# Patient Record
Sex: MALE | Race: BLACK OR AFRICAN AMERICAN | NOT HISPANIC OR LATINO | Employment: FULL TIME | URBAN - METROPOLITAN AREA
[De-identification: names, ages, dates, MRNs, and addresses within clinical notes are randomized per-mention and may not be internally consistent; named-entity substitution may affect disease eponyms.]

---

## 2017-07-19 ENCOUNTER — HOSPITAL ENCOUNTER (INPATIENT)
Facility: HOSPITAL | Age: 42
LOS: 1 days | Discharge: HOME | DRG: 683 | End: 2017-07-20
Attending: EMERGENCY MEDICINE | Admitting: HOSPITALIST
Payer: MEDICAID

## 2017-07-19 DIAGNOSIS — M62.82 ACUTE RENAL FAILURE DUE TO RHABDOMYOLYSIS: ICD-10-CM

## 2017-07-19 DIAGNOSIS — R01.1 HEART MURMUR: ICD-10-CM

## 2017-07-19 DIAGNOSIS — R56.9 SEIZURES: ICD-10-CM

## 2017-07-19 DIAGNOSIS — N17.9 ACUTE RENAL FAILURE, UNSPECIFIED ACUTE RENAL FAILURE TYPE: Primary | ICD-10-CM

## 2017-07-19 DIAGNOSIS — N17.9 ACUTE RENAL FAILURE DUE TO RHABDOMYOLYSIS: ICD-10-CM

## 2017-07-19 DIAGNOSIS — D72.823 LEUKEMOID REACTION: ICD-10-CM

## 2017-07-19 DIAGNOSIS — M62.82 NON-TRAUMATIC RHABDOMYOLYSIS: ICD-10-CM

## 2017-07-19 DIAGNOSIS — Z72.0 TOBACCO ABUSE: ICD-10-CM

## 2017-07-19 PROBLEM — D72.829 LEUKOCYTOSIS: Status: ACTIVE | Noted: 2017-07-19

## 2017-07-19 LAB
ALBUMIN SERPL BCP-MCNC: 4.4 G/DL
ALBUMIN SERPL BCP-MCNC: 5.7 G/DL
ALP SERPL-CCNC: 103 U/L
ALP SERPL-CCNC: 82 U/L
ALT SERPL W/O P-5'-P-CCNC: 23 U/L
ALT SERPL W/O P-5'-P-CCNC: 29 U/L
AMPHET+METHAMPHET UR QL: NEGATIVE
ANION GAP SERPL CALC-SCNC: 18 MMOL/L
ANION GAP SERPL CALC-SCNC: 24 MMOL/L
ANISOCYTOSIS BLD QL SMEAR: SLIGHT
AST SERPL-CCNC: 34 U/L
AST SERPL-CCNC: 38 U/L
BACTERIA #/AREA URNS HPF: ABNORMAL /HPF
BARBITURATES UR QL SCN>200 NG/ML: NEGATIVE
BASOPHILS # BLD AUTO: 0.01 K/UL
BASOPHILS NFR BLD: 0 %
BENZODIAZ UR QL SCN>200 NG/ML: NEGATIVE
BILIRUB SERPL-MCNC: 0.9 MG/DL
BILIRUB SERPL-MCNC: 0.9 MG/DL
BILIRUB UR QL STRIP: NEGATIVE
BUN SERPL-MCNC: 51 MG/DL
BUN SERPL-MCNC: 57 MG/DL
BZE UR QL SCN: NEGATIVE
CALCIUM SERPL-MCNC: 11.4 MG/DL
CALCIUM SERPL-MCNC: 9.4 MG/DL
CANNABINOIDS UR QL SCN: NEGATIVE
CHLORIDE SERPL-SCNC: 100 MMOL/L
CHLORIDE SERPL-SCNC: 91 MMOL/L
CK SERPL-CCNC: 2291 U/L
CK SERPL-CCNC: 2933 U/L
CLARITY UR: ABNORMAL
CO2 SERPL-SCNC: 21 MMOL/L
CO2 SERPL-SCNC: 22 MMOL/L
COLOR UR: YELLOW
CREAT SERPL-MCNC: 5 MG/DL
CREAT SERPL-MCNC: 6.2 MG/DL
CREAT UR-MCNC: 202.4 MG/DL
DACRYOCYTES BLD QL SMEAR: ABNORMAL
DIFFERENTIAL METHOD: ABNORMAL
EOSINOPHIL # BLD AUTO: 0 K/UL
EOSINOPHIL NFR BLD: 0 %
ERYTHROCYTE [DISTWIDTH] IN BLOOD BY AUTOMATED COUNT: 13.5 %
EST. GFR  (AFRICAN AMERICAN): 12 ML/MIN/1.73 M^2
EST. GFR  (AFRICAN AMERICAN): 15 ML/MIN/1.73 M^2
EST. GFR  (NON AFRICAN AMERICAN): 10 ML/MIN/1.73 M^2
EST. GFR  (NON AFRICAN AMERICAN): 13 ML/MIN/1.73 M^2
ETHANOL SERPL-MCNC: <10 MG/DL
GLUCOSE SERPL-MCNC: 105 MG/DL
GLUCOSE SERPL-MCNC: 133 MG/DL
GLUCOSE UR QL STRIP: NEGATIVE
GRAN CASTS #/AREA URNS LPF: 6 /LPF
HCT VFR BLD AUTO: 54.3 %
HGB BLD-MCNC: 19.5 G/DL
HGB UR QL STRIP: ABNORMAL
HYALINE CASTS #/AREA URNS LPF: 3 /LPF
KETONES UR QL STRIP: ABNORMAL
LEUKOCYTE ESTERASE UR QL STRIP: ABNORMAL
LIPASE SERPL-CCNC: 20 U/L
LYMPHOCYTES # BLD AUTO: 1.1 K/UL
LYMPHOCYTES NFR BLD: 4.3 %
MCH RBC QN AUTO: 31.2 PG
MCHC RBC AUTO-ENTMCNC: 35.9 %
MCV RBC AUTO: 87 FL
METHADONE UR QL SCN>300 NG/ML: NEGATIVE
MICROSCOPIC COMMENT: ABNORMAL
MONOCYTES # BLD AUTO: 1.5 K/UL
MONOCYTES NFR BLD: 5.9 %
NEUTROPHILS # BLD AUTO: 23 K/UL
NEUTROPHILS NFR BLD: 90.2 %
NITRITE UR QL STRIP: NEGATIVE
OPIATES UR QL SCN: NORMAL
OVALOCYTES BLD QL SMEAR: ABNORMAL
PCP UR QL SCN>25 NG/ML: NEGATIVE
PH UR STRIP: 5 [PH] (ref 5–8)
PLATELET # BLD AUTO: 260 K/UL
PMV BLD AUTO: 11.3 FL
POTASSIUM SERPL-SCNC: 4.5 MMOL/L
POTASSIUM SERPL-SCNC: 4.5 MMOL/L
PROT SERPL-MCNC: 10.7 G/DL
PROT SERPL-MCNC: 8.3 G/DL
PROT UR QL STRIP: ABNORMAL
RBC # BLD AUTO: 6.26 M/UL
RBC #/AREA URNS HPF: 50 /HPF (ref 0–4)
SODIUM SERPL-SCNC: 137 MMOL/L
SODIUM SERPL-SCNC: 139 MMOL/L
SP GR UR STRIP: >1.03 (ref 1–1.03)
SQUAMOUS #/AREA URNS HPF: 4 /HPF
TOXICOLOGY INFORMATION: NORMAL
URN SPEC COLLECT METH UR: ABNORMAL
UROBILINOGEN UR STRIP-ACNC: NEGATIVE EU/DL
WBC # BLD AUTO: 25.62 K/UL
WBC #/AREA URNS HPF: 12 /HPF (ref 0–5)

## 2017-07-19 PROCEDURE — 85025 COMPLETE CBC W/AUTO DIFF WBC: CPT

## 2017-07-19 PROCEDURE — 80320 DRUG SCREEN QUANTALCOHOLS: CPT

## 2017-07-19 PROCEDURE — 25000003 PHARM REV CODE 250: Performed by: EMERGENCY MEDICINE

## 2017-07-19 PROCEDURE — 82550 ASSAY OF CK (CPK): CPT

## 2017-07-19 PROCEDURE — 12000002 HC ACUTE/MED SURGE SEMI-PRIVATE ROOM

## 2017-07-19 PROCEDURE — 96360 HYDRATION IV INFUSION INIT: CPT

## 2017-07-19 PROCEDURE — 82550 ASSAY OF CK (CPK): CPT | Mod: 91

## 2017-07-19 PROCEDURE — 36415 COLL VENOUS BLD VENIPUNCTURE: CPT

## 2017-07-19 PROCEDURE — 80307 DRUG TEST PRSMV CHEM ANLYZR: CPT

## 2017-07-19 PROCEDURE — 99284 EMERGENCY DEPT VISIT MOD MDM: CPT | Mod: 25

## 2017-07-19 PROCEDURE — 80053 COMPREHEN METABOLIC PANEL: CPT | Mod: 91

## 2017-07-19 PROCEDURE — 81000 URINALYSIS NONAUTO W/SCOPE: CPT

## 2017-07-19 PROCEDURE — 80053 COMPREHEN METABOLIC PANEL: CPT

## 2017-07-19 PROCEDURE — 83690 ASSAY OF LIPASE: CPT

## 2017-07-19 PROCEDURE — 63600175 PHARM REV CODE 636 W HCPCS: Performed by: EMERGENCY MEDICINE

## 2017-07-19 RX ORDER — MORPHINE SULFATE 10 MG/ML
2 INJECTION INTRAMUSCULAR; INTRAVENOUS; SUBCUTANEOUS EVERY 4 HOURS PRN
Status: DISCONTINUED | OUTPATIENT
Start: 2017-07-19 | End: 2017-07-20 | Stop reason: HOSPADM

## 2017-07-19 RX ORDER — ACETAMINOPHEN 325 MG/1
650 TABLET ORAL EVERY 8 HOURS PRN
Status: DISCONTINUED | OUTPATIENT
Start: 2017-07-19 | End: 2017-07-20 | Stop reason: HOSPADM

## 2017-07-19 RX ORDER — ONDANSETRON 2 MG/ML
4 INJECTION INTRAMUSCULAR; INTRAVENOUS EVERY 4 HOURS PRN
Status: DISCONTINUED | OUTPATIENT
Start: 2017-07-19 | End: 2017-07-20 | Stop reason: HOSPADM

## 2017-07-19 RX ORDER — POLYETHYLENE GLYCOL 3350 17 G/17G
17 POWDER, FOR SOLUTION ORAL DAILY
Status: DISCONTINUED | OUTPATIENT
Start: 2017-07-19 | End: 2017-07-20 | Stop reason: HOSPADM

## 2017-07-19 RX ORDER — HEPARIN SODIUM 5000 [USP'U]/ML
5000 INJECTION, SOLUTION INTRAVENOUS; SUBCUTANEOUS EVERY 8 HOURS
Status: DISCONTINUED | OUTPATIENT
Start: 2017-07-19 | End: 2017-07-20 | Stop reason: HOSPADM

## 2017-07-19 RX ORDER — SODIUM CHLORIDE 9 MG/ML
INJECTION, SOLUTION INTRAVENOUS CONTINUOUS
Status: DISCONTINUED | OUTPATIENT
Start: 2017-07-19 | End: 2017-07-20 | Stop reason: HOSPADM

## 2017-07-19 RX ADMIN — HEPARIN SODIUM 5000 UNITS: 5000 INJECTION, SOLUTION INTRAVENOUS; SUBCUTANEOUS at 09:07

## 2017-07-19 RX ADMIN — SODIUM CHLORIDE: 0.9 INJECTION, SOLUTION INTRAVENOUS at 01:07

## 2017-07-19 RX ADMIN — ONDANSETRON 4 MG: 2 INJECTION INTRAMUSCULAR; INTRAVENOUS at 06:07

## 2017-07-19 RX ADMIN — SODIUM CHLORIDE: 0.9 INJECTION, SOLUTION INTRAVENOUS at 05:07

## 2017-07-19 RX ADMIN — MORPHINE SULFATE 2 MG: 10 INJECTION INTRAVENOUS at 10:07

## 2017-07-19 RX ADMIN — SODIUM CHLORIDE: 0.9 INJECTION, SOLUTION INTRAVENOUS at 08:07

## 2017-07-19 RX ADMIN — SODIUM CHLORIDE: 0.9 INJECTION, SOLUTION INTRAVENOUS at 09:07

## 2017-07-19 RX ADMIN — ONDANSETRON 4 MG: 2 INJECTION INTRAMUSCULAR; INTRAVENOUS at 08:07

## 2017-07-19 RX ADMIN — ONDANSETRON 4 MG: 2 INJECTION INTRAMUSCULAR; INTRAVENOUS at 10:07

## 2017-07-19 RX ADMIN — MORPHINE SULFATE 2 MG: 10 INJECTION INTRAVENOUS at 08:07

## 2017-07-19 RX ADMIN — SODIUM CHLORIDE 1000 ML: 0.9 INJECTION, SOLUTION INTRAVENOUS at 05:07

## 2017-07-19 RX ADMIN — MORPHINE SULFATE 2 MG: 10 INJECTION INTRAVENOUS at 06:07

## 2017-07-19 RX ADMIN — HEPARIN SODIUM 5000 UNITS: 5000 INJECTION, SOLUTION INTRAVENOUS; SUBCUTANEOUS at 04:07

## 2017-07-19 NOTE — PLAN OF CARE
"SW met with patient to complete discharge needs assessment. SW provided and reviewed with patient "[facility] Packet", "Discharge Planning Begins on Admission" brochure and "Help At Home" handout. SW discussed with patient the things he will be responsible for to manage his healthcare at home. Patient reports that he has no preference for doctor appointment times. YEISON requested for patient to call his wife and ask her to look at his medicaid card to find out name of PCP.      [id number] - [place], [place] - [address] AT [place] [address]  [facility] [address]  Phone: [phone] Fax: [phone]      [id number] - [place], [place] - [address] AT [facility] & [place]  [address]  [facility] [address]  Phone: [phone] Fax: [phone]    [facility] [place] [address]  [address]  [place] [address]  Phone: [phone] Fax: [phone]             07/19/17 1624   Discharge Assessment   Assessment Type Discharge Planning Assessment   Confirmed/corrected address and phone number on facesheet? Yes   Assessment information obtained from? Patient   Type of Healthcare Directive Received (None)   Prior to hospitilization cognitive status: Alert/Oriented;No Deficits   Prior to hospitalization functional status: Independent   Current cognitive status: Alert/Oriented;No Deficits   Current Functional Status: Independent   Arrived From home or self-care   Lives With spouse   Able to Return to Prior Arrangements yes   Is patient able to care for self after discharge? Yes   How many people do you have in your home that can help with your care after discharge? 1   Who are your caregiver(s) and their phone number(s)? Ines (spouse) [phone]   Patient's perception of discharge disposition home or selfcare   Readmission Within The Last 30 Days no previous admission in last 30 days   Patient " currently being followed by outpatient case management? No   Patient currently receives home health services? No   Does the patient currently use HME? No   Patient currently receives private duty nursing? No   Patient currently receives any other outside agency services? No   Equipment Currently Used at Home none   Do you have any problems affording any of your prescribed medications? No   Do you have any financial concerns preventing you from receiving the healthcare you need? No   Does the patient have transportation to healthcare appointments? Yes   Transportation Available car;family or friend will provide   On Dialysis? No   Does the patient receive services at the Coumadin Clinic? No   Are there any open cases? No   Discharge Plan A Home with family  (PCP F/U)   Discharge Plan B Home with family   Patient/Family In Agreement With Plan yes

## 2017-07-19 NOTE — ED TRIAGE NOTES
Pt complaining of RUQ abdominal pain, back pain, nv since Monday. Pt states that he hasn't been able to urinate. Denies SOB, hx of kidney disease.

## 2017-07-19 NOTE — HPI
"41 y/o male with +TOB who presented with worsening weakness with N/V since 7/17/17. He works in a hot warehouse lifting boxes, and he reported doing this 5 days in a row and on Monday he started having the symptoms so bad that he stayed home from work. He tried to drink and eat but could not keep anything down. He tries to drink at work since he sweats so much and he thought he was keeping up. He also reported diarrhea and he reported worsening of symptoms after eating fried chicken. No sick contacts. He reported darkening and decreasing urine output, muscle "cramping" everywhere. In the ER, workup was remarkable for BUN/creatinine of 51/6.2 and CPK of 2933.  "

## 2017-07-19 NOTE — SUBJECTIVE & OBJECTIVE
Past Medical History:   Diagnosis Date    Heart murmur     Seizures     as a child till 14 yrs old.       Past Surgical History:   Procedure Laterality Date    circumcised         Review of patient's allergies indicates:  No Known Allergies    No current facility-administered medications on file prior to encounter.      Current Outpatient Prescriptions on File Prior to Encounter   Medication Sig    ibuprofen (ADVIL,MOTRIN) 600 MG tablet Take 1 tablet (600 mg total) by mouth every 6 (six) hours as needed for Pain.    ibuprofen (ADVIL,MOTRIN) 600 MG tablet Take 1 tablet (600 mg total) by mouth every 6 (six) hours as needed for Pain.     Family History     Problem Relation (Age of Onset)    Cancer Mother, Father        Social History Main Topics    Smoking status: Current Every Day Smoker     Packs/day: 1.00    Smokeless tobacco: Never Used    Alcohol use Yes      Comment: occasional    Drug use:      Types: Marijuana    Sexual activity: Yes     Partners: Female     Review of Systems   Constitutional: Negative for chills and fever.   HENT: Negative for sore throat.    Eyes: Negative for visual disturbance.   Respiratory: Negative for shortness of breath.    Cardiovascular: Negative for chest pain.   Gastrointestinal: Positive for diarrhea, nausea and vomiting.   Genitourinary: Positive for decreased urine volume and difficulty urinating.   Musculoskeletal: Positive for myalgias.   Skin: Negative for rash.   Neurological: Positive for dizziness and light-headedness.   Psychiatric/Behavioral: Negative for agitation.     Objective:     Vital Signs (Most Recent):  Temp: 97 °F (36.1 °C) (07/19/17 1200)  Pulse: 71 (07/19/17 1200)  Resp: 18 (07/19/17 1200)  BP: 131/76 (07/19/17 1200)  SpO2: 97 % (07/19/17 1200) Vital Signs (24h Range):  Temp:  [97 °F (36.1 °C)-99 °F (37.2 °C)] 97 °F (36.1 °C)  Pulse:  [] 71  Resp:  [16-18] 18  SpO2:  [96 %-99 %] 97 %  BP: (126-147)/(73-87) 131/76     Weight: 81.6 kg (180  lb)  Body mass index is 27.37 kg/m².    Physical Exam   Constitutional: He is oriented to person, place, and time. He appears well-developed and well-nourished.   HENT:   Head: Normocephalic and atraumatic.   Dry mucous membranes   Eyes: EOM are normal. Pupils are equal, round, and reactive to light.   Neck: Normal range of motion. Neck supple.   Cardiovascular: Normal rate and regular rhythm.    Pulmonary/Chest: Effort normal and breath sounds normal.   Abdominal: Soft. Bowel sounds are normal.   Musculoskeletal: Normal range of motion.   Neurological: He is alert and oriented to person, place, and time.   Skin: Skin is warm and dry.   Psychiatric: He has a normal mood and affect.        Significant Labs: All pertinent labs within the past 24 hours have been reviewed.    Significant Imaging: I have reviewed and interpreted all pertinent imaging results/findings within the past 24 hours.

## 2017-07-19 NOTE — NURSING
Patient arrived via stetcher alert and oriented. Patient complains of pain in abdomen numeric scale 0-10, 9.

## 2017-07-19 NOTE — MEDICAL/APP STUDENT
[facility] Hospital Medicine  History & Physical    Patient Name: Dedrick Daily  MRN: [id number]  Admission Date: 2017  Attending Physician: Sheryl Nesbitt MD   Primary Care Provider: Primary Doctor No         Patient information was obtained from patient and ER records.     Subjective:     Principal Problem: acute renal injury  Chief Complaint:   Chief Complaint   Patient presents with    Vomiting     pt comes to the er with vomiting and diarrhea after eating chicken yestarday.         HPI: The patient is a 42 year old male with a history of seizures who presented to the ED with complaints of nausea, vomiting, diarrhea, and decreased urine output that started 2 days ago, which he attributes to eating [employer] chicken. Pt reports he has been unable to eat or drink much since symptoms presented and has not urinated since Monday afternoon. Associated symptoms include upper bilateral quadrant pain, right flank pain, body cramps, fatigue, chills, and intermittent headache. Pt denies alleviating or aggravating factors. Pt last vomited PTA. He denies sick contacts, fever, SOB, cough, chest pain, hematuria, melena, rashes, edema, seizures, and syncope.     Past Medical History:   Diagnosis Date    Heart murmur     Seizures     as a child till 14 yrs old.       Past Surgical History:   Procedure Laterality Date    circumcised         Review of patient's allergies indicates:  No Known Allergies    No current facility-administered medications on file prior to encounter.      Current Outpatient Prescriptions on File Prior to Encounter   Medication Sig    ibuprofen (ADVIL,MOTRIN) 600 MG tablet Take 1 tablet (600 mg total) by mouth every 6 (six) hours as needed for Pain.    ibuprofen (ADVIL,MOTRIN) 600 MG tablet Take 1 tablet (600 mg total) by mouth every 6 (six) hours as needed for Pain.     Family History     Father-cancer,   Mother-cancer,         Social History Main Topics     Smoking status: Current Every Day Smoker     Packs/day: 1.00    Smokeless tobacco: Never Used    Alcohol use Yes      Comment: occasional    Drug use:      Types: Marijuana    Sexual activity: Yes     Partners: Female     Review of Systems   Constitutional: Positive for appetite change, chills and fatigue. Negative for fever.   HENT: Negative for congestion, postnasal drip, rhinorrhea and tinnitus.    Eyes: Negative for pain, redness and visual disturbance.   Respiratory: Negative for cough, chest tightness and shortness of breath.    Cardiovascular: Negative for chest pain, palpitations and leg swelling.   Gastrointestinal: Positive for abdominal pain, diarrhea, nausea and vomiting. Negative for blood in stool and constipation.   Genitourinary: Positive for decreased urine volume and flank pain. Negative for dysuria, hematuria and urgency.   Musculoskeletal: Negative for myalgias, neck pain and neck stiffness.   Skin: Negative for pallor, rash and wound.   Neurological: Positive for headaches. Negative for dizziness, seizures, syncope and light-headedness.   Psychiatric/Behavioral: Negative for confusion and hallucinations.     Objective:     Vital Signs (Most Recent):  Temp: 98.5 °F (36.9 °C) (07/19/17 0800)  Pulse: 66 (07/19/17 0800)  Resp: 16 (07/19/17 0800)  BP: 126/73 (07/19/17 0800)  SpO2: 96 % (07/19/17 0800) Vital Signs (24h Range):  Temp:  [98.2 °F (36.8 °C)-99 °F (37.2 °C)] 98.5 °F (36.9 °C)  Pulse:  [] 66  Resp:  [16] 16  SpO2:  [96 %-99 %] 96 %  BP: (126-147)/(73-87) 126/73     Weight: 81.6 kg (180 lb)  Body mass index is 27.37 kg/m².    Physical Exam   Constitutional: He is oriented to person, place, and time. He appears well-developed and well-nourished. No distress.   HENT:   Head: Normocephalic and atraumatic.   Right Ear: External ear normal.   Left Ear: External ear normal.   Eyes: Conjunctivae and EOM are normal. Pupils are equal, round, and reactive to light.   Neck: Normal range of  motion. Neck supple.   Cardiovascular: Normal rate and regular rhythm.    No murmur heard.  Pulmonary/Chest: Effort normal and breath sounds normal.   Abdominal: Soft. Bowel sounds are increased. There is tenderness in the right upper quadrant and left upper quadrant. There is no rebound and no CVA tenderness.   Musculoskeletal: Normal range of motion. He exhibits no edema, tenderness or deformity.   Neurological: He is alert and oriented to person, place, and time.   Skin: Skin is warm and dry.       Significant Labs:   Bilirubin:   Recent Labs  Lab 07/19/17 0311   BILITOT 0.9     BMP:   Recent Labs  Lab 07/19/17 0311   *      K 4.5   CL 91*   CO2 22*   BUN 51*   CREATININE 6.2*   CALCIUM 11.4*     CBC:   Recent Labs  Lab 07/19/17 0311   WBC 25.62*   HGB 19.5*   HCT 54.3*        Lipase:   Recent Labs  Lab 07/19/17  0520   LIPASE 20         Significant Imaging: I have reviewed all pertinent imaging results/findings within the past 24 hours.    Assessment/Plan:   1. Rhabdomyolysis. As evidenced by labs. CPK 2933. Continue IVF. Monitor CPK.    2. Acute renal failure. As evidenced by history and labs. Creatinine 6.2, BUN 51. Most likely prerenal to intrinsic. Retroperitoneal ultrasound, continue IVF. Repeat BMP in am. Ua. NPO and advance diet as tolerated. Renal diet. Anti emetics. Analgesics PRN. Avoid nephrotoxic medications. Nephrology consult.    3. Tobacco abuse. Encourage cessation. Nicotine patch PRN.    Active Diagnoses:    Diagnosis Date Noted POA    Acute renal failure [N17.9] 07/19/2017 Yes      Problems Resolved During this Admission:    Diagnosis Date Noted Date Resolved POA     VTE Risk Mitigation         Ordered     heparin (porcine) injection 5,000 Units  Every 8 hours     Route:  Subcutaneous        07/19/17 0759     Medium Risk of VTE  Once      07/19/17 0759     Place sequential compression device  Until discontinued      07/19/17 0759     Place DEANA hose  Until discontinued       07/19/17 0759        Shila Kemp  Department of Hospital Medicine   [facility]

## 2017-07-20 VITALS
OXYGEN SATURATION: 97 % | HEART RATE: 56 BPM | BODY MASS INDEX: 27.28 KG/M2 | SYSTOLIC BLOOD PRESSURE: 132 MMHG | DIASTOLIC BLOOD PRESSURE: 63 MMHG | WEIGHT: 180 LBS | TEMPERATURE: 98 F | HEIGHT: 68 IN | RESPIRATION RATE: 18 BRPM

## 2017-07-20 PROBLEM — N17.9 ACUTE RENAL FAILURE: Status: RESOLVED | Noted: 2017-07-19 | Resolved: 2017-07-20

## 2017-07-20 PROBLEM — M62.82 RHABDOMYOLYSIS: Status: RESOLVED | Noted: 2017-07-19 | Resolved: 2017-07-20

## 2017-07-20 LAB
ANION GAP SERPL CALC-SCNC: 10 MMOL/L
ANION GAP SERPL CALC-SCNC: 7 MMOL/L
BASOPHILS # BLD AUTO: 0.01 K/UL
BASOPHILS # BLD AUTO: 0.02 K/UL
BASOPHILS NFR BLD: 0.1 %
BASOPHILS NFR BLD: 0.1 %
BUN SERPL-MCNC: 36 MG/DL
BUN SERPL-MCNC: 49 MG/DL
CALCIUM SERPL-MCNC: 8.9 MG/DL
CALCIUM SERPL-MCNC: 9.2 MG/DL
CHLORIDE SERPL-SCNC: 106 MMOL/L
CHLORIDE SERPL-SCNC: 109 MMOL/L
CK SERPL-CCNC: 1169 U/L
CK SERPL-CCNC: 1593 U/L
CO2 SERPL-SCNC: 23 MMOL/L
CO2 SERPL-SCNC: 23 MMOL/L
CREAT SERPL-MCNC: 1.7 MG/DL
CREAT SERPL-MCNC: 2.7 MG/DL
DIFFERENTIAL METHOD: ABNORMAL
DIFFERENTIAL METHOD: ABNORMAL
EOSINOPHIL # BLD AUTO: 0 K/UL
EOSINOPHIL # BLD AUTO: 0 K/UL
EOSINOPHIL NFR BLD: 0.1 %
EOSINOPHIL NFR BLD: 0.1 %
ERYTHROCYTE [DISTWIDTH] IN BLOOD BY AUTOMATED COUNT: 13.2 %
ERYTHROCYTE [DISTWIDTH] IN BLOOD BY AUTOMATED COUNT: 13.5 %
EST. GFR  (AFRICAN AMERICAN): 33 ML/MIN/1.73 M^2
EST. GFR  (AFRICAN AMERICAN): 57 ML/MIN/1.73 M^2
EST. GFR  (NON AFRICAN AMERICAN): 28 ML/MIN/1.73 M^2
EST. GFR  (NON AFRICAN AMERICAN): 49 ML/MIN/1.73 M^2
GLUCOSE SERPL-MCNC: 89 MG/DL
GLUCOSE SERPL-MCNC: 93 MG/DL
HCT VFR BLD AUTO: 44.6 %
HCT VFR BLD AUTO: 46.4 %
HGB BLD-MCNC: 15.2 G/DL
HGB BLD-MCNC: 16 G/DL
LYMPHOCYTES # BLD AUTO: 1.5 K/UL
LYMPHOCYTES # BLD AUTO: 1.5 K/UL
LYMPHOCYTES NFR BLD: 10 %
LYMPHOCYTES NFR BLD: 7.8 %
MCH RBC QN AUTO: 30.3 PG
MCH RBC QN AUTO: 30.4 PG
MCHC RBC AUTO-ENTMCNC: 34.1 G/DL
MCHC RBC AUTO-ENTMCNC: 34.5 G/DL
MCV RBC AUTO: 88 FL
MCV RBC AUTO: 89 FL
MONOCYTES # BLD AUTO: 1.3 K/UL
MONOCYTES # BLD AUTO: 1.7 K/UL
MONOCYTES NFR BLD: 8.3 %
MONOCYTES NFR BLD: 8.7 %
NEUTROPHILS # BLD AUTO: 11.9 K/UL
NEUTROPHILS # BLD AUTO: 16.6 K/UL
NEUTROPHILS NFR BLD: 81.1 %
NEUTROPHILS NFR BLD: 83.3 %
PLATELET # BLD AUTO: 180 K/UL
PLATELET # BLD AUTO: 222 K/UL
PMV BLD AUTO: 11.1 FL
PMV BLD AUTO: 12.1 FL
POTASSIUM SERPL-SCNC: 4.1 MMOL/L
POTASSIUM SERPL-SCNC: 4.4 MMOL/L
RBC # BLD AUTO: 5.02 M/UL
RBC # BLD AUTO: 5.27 M/UL
SODIUM SERPL-SCNC: 139 MMOL/L
SODIUM SERPL-SCNC: 139 MMOL/L
WBC # BLD AUTO: 14.66 K/UL
WBC # BLD AUTO: 19.87 K/UL

## 2017-07-20 PROCEDURE — 36415 COLL VENOUS BLD VENIPUNCTURE: CPT

## 2017-07-20 PROCEDURE — 80048 BASIC METABOLIC PNL TOTAL CA: CPT | Mod: 91

## 2017-07-20 PROCEDURE — 85025 COMPLETE CBC W/AUTO DIFF WBC: CPT

## 2017-07-20 PROCEDURE — 25000003 PHARM REV CODE 250: Performed by: EMERGENCY MEDICINE

## 2017-07-20 PROCEDURE — 80048 BASIC METABOLIC PNL TOTAL CA: CPT

## 2017-07-20 PROCEDURE — 82550 ASSAY OF CK (CPK): CPT | Mod: 91

## 2017-07-20 RX ORDER — ACETAMINOPHEN 325 MG/1
650 TABLET ORAL EVERY 8 HOURS PRN
Refills: 0 | COMMUNITY
Start: 2017-07-20

## 2017-07-20 RX ADMIN — POLYETHYLENE GLYCOL 3350 17 G: 17 POWDER, FOR SOLUTION ORAL at 01:07

## 2017-07-20 RX ADMIN — SODIUM CHLORIDE: 0.9 INJECTION, SOLUTION INTRAVENOUS at 06:07

## 2017-07-20 RX ADMIN — HEPARIN SODIUM 5000 UNITS: 5000 INJECTION, SOLUTION INTRAVENOUS; SUBCUTANEOUS at 01:07

## 2017-07-20 RX ADMIN — HEPARIN SODIUM 5000 UNITS: 5000 INJECTION, SOLUTION INTRAVENOUS; SUBCUTANEOUS at 06:07

## 2017-07-20 NOTE — PLAN OF CARE
YEISON met with patient to provide and review discharge follow-up instruction sheet. YEISON reminded patient the things he will be responsible for to manage his health at home by utilizing teach back method. YEISON informed nurse Matheus  completed all discharge planning for patient and she could complete her nursing education/discharge when ready.       07/20/17 1541   Final Note   Assessment Type Final Discharge Note   Discharge Disposition Home   Hospital Follow Up  Appt(s) scheduled? Yes   Offered [facility] -- Bedside Delivery? n/a   Discharge/Hospital Encounter Summary to ([facility]) PCP n/a   Referral to Outpatient Case Management complete? n/a   Referral to / orders for Home Health Complete? n/a   30 day supply of medicines given at discharge, if documented non-compliance / non-adherence? n/a   Any social issues identified prior to discharge? n/a   Did you assess the readiness or willingness of the family or caregiver to support self management of care? n/a   Right Care Referral Info   Post Acute Recommendation No Care

## 2017-07-20 NOTE — PROGRESS NOTES
YEISON met with patient to inquire if he found out the name of his PCP, patient confirmed he did find out the name. YEISON informed patient she will refer him to the [facility] to establish with a PCP. Patient requested [facility] in [place]. YEISON contacted [facility] @ [phone] and spoke to Kavita to schedule appointment. YEISON informed Kavita patient would like appointment at [facility]. Patient will see Dr. Norah Pimentel on 7/25/17 @ 1:30pm.

## 2017-07-20 NOTE — PLAN OF CARE
Problem: Pain, Acute (Adult)  Goal: Identify Related Risk Factors and Signs and Symptoms  Related risk factors and signs and symptoms are identified upon initiation of Human Response Clinical Practice Guideline (CPG)   Outcome: Ongoing (interventions implemented as appropriate)  Educated patient on pain management schedule.

## 2017-07-20 NOTE — PLAN OF CARE
Problem: Fall Risk (Adult)  Goal: Identify Related Risk Factors and Signs and Symptoms  Related risk factors and signs and symptoms are identified upon initiation of Human Response Clinical Practice Guideline (CPG)   Outcome: Ongoing (interventions implemented as appropriate)   07/20/17 0603   Fall Risk   Related Risk Factors (Fall Risk) environment unfamiliar   Signs and Symptoms (Fall Risk) presence of risk factors     Goal: Absence of Falls  Patient will demonstrate the desired outcomes by discharge/transition of care.   Outcome: Ongoing (interventions implemented as appropriate)   07/20/17 0603   Fall Risk (Adult)   Absence of Falls making progress toward outcome       Problem: Pain, Acute (Adult)  Goal: Identify Related Risk Factors and Signs and Symptoms  Related risk factors and signs and symptoms are identified upon initiation of Human Response Clinical Practice Guideline (CPG)   Outcome: Ongoing (interventions implemented as appropriate)   07/19/17 1940   Pain, Acute   Related Risk Factors (Acute Pain) positioning;persistent pain   Signs and Symptoms (Acute Pain) moaning;fatigue/weakness     Goal: Acceptable Pain Control/Comfort Level  Patient will demonstrate the desired outcomes by discharge/transition of care.   Outcome: Ongoing (interventions implemented as appropriate)   07/20/17 0603   Pain, Acute (Adult)   Acceptable Pain Control/Comfort Level making progress toward outcome       Problem: Renal Failure/Kidney Injury, Acute (Adult)  Goal: Signs and Symptoms of Listed Potential Problems Will be Absent, Minimized or Managed (Renal Failure/Kidney Injury, Acute)  Signs and symptoms of listed potential problems will be absent, minimized or managed by discharge/transition of care (reference Renal Failure/Kidney Injury, Acute (Adult) CPG).   Outcome: Ongoing (interventions implemented as appropriate)   07/20/17 0603   Renal Failure/Kidney Injury, Acute   Problems Assessed (Acute Renal Failure/Kidney Injury)  all   Problems Present (Acute Renal Failure/Kidney Injury) none

## 2017-07-20 NOTE — H&P
"[facility] Hospital Medicine  History & Physical    Patient Name: Dedrick Daily  MRN: [id number]  Admission Date: 7/19/2017  Attending Physician: Sheryl Nesbitt MD   Primary Care Provider: Primary Doctor No         Patient information was obtained from patient, spouse/SO, past medical records and ER records.     Subjective:     Principal Problem:Acute renal failure    Chief Complaint:   Chief Complaint   Patient presents with    Vomiting     pt comes to the er with vomiting and diarrhea after eating chicken yestarday.         HPI: 43 y/o male with +TOB who presented with worsening weakness with N/V since 7/17/17. 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 He tried to drink and eat but could not keep anything down. He tries to drink at work since he sweats so much and he thought he was keeping up. He also reported diarrhea and he reported worsening of symptoms after eating fried chicken. No sick contacts. He reported darkening and decreasing urine output, muscle "cramping" everywhere. In the ER, workup was remarkable for BUN/creatinine of 51/6.2 and CPK of 2933.    Past Medical History:   Diagnosis Date    Heart murmur     Seizures     as a child till 14 yrs old.       Past Surgical History:   Procedure Laterality Date    circumcised         Review of patient's allergies indicates:  No Known Allergies    No current facility-administered medications on file prior to encounter.      Current Outpatient Prescriptions on File Prior to Encounter   Medication Sig    ibuprofen (ADVIL,MOTRIN) 600 MG tablet Take 1 tablet (600 mg total) by mouth every 6 (six) hours as needed for Pain.    ibuprofen (ADVIL,MOTRIN) 600 MG tablet Take 1 tablet (600 mg total) by mouth every 6 (six) hours as needed for Pain.     Family History     Problem Relation (Age of Onset)    Cancer Mother, " Father        Social History Main Topics    Smoking status: Current Every Day Smoker     Packs/day: 1.00    Smokeless tobacco: Never Used    Alcohol use Yes      Comment: occasional    Drug use:      Types: Marijuana    Sexual activity: Yes     Partners: Female     Review of Systems   Constitutional: Negative for chills and fever.   HENT: Negative for sore throat.    Eyes: Negative for visual disturbance.   Respiratory: Negative for shortness of breath.    Cardiovascular: Negative for chest pain.   Gastrointestinal: Positive for diarrhea, nausea and vomiting.   Genitourinary: Positive for decreased urine volume and difficulty urinating.   Musculoskeletal: Positive for myalgias.   Skin: Negative for rash.   Neurological: Positive for dizziness and light-headedness.   Psychiatric/Behavioral: Negative for agitation.     Objective:     Vital Signs (Most Recent):  Temp: 97 °F (36.1 °C) (07/19/17 1200)  Pulse: 71 (07/19/17 1200)  Resp: 18 (07/19/17 1200)  BP: 131/76 (07/19/17 1200)  SpO2: 97 % (07/19/17 1200) Vital Signs (24h Range):  Temp:  [97 °F (36.1 °C)-99 °F (37.2 °C)] 97 °F (36.1 °C)  Pulse:  [] 71  Resp:  [16-18] 18  SpO2:  [96 %-99 %] 97 %  BP: (126-147)/(73-87) 131/76     Weight: 81.6 kg (180 lb)  Body mass index is 27.37 kg/m².    Physical Exam   Constitutional: He is oriented to person, place, and time. He appears well-developed and well-nourished.   HENT:   Head: Normocephalic and atraumatic.   Dry mucous membranes   Eyes: EOM are normal. Pupils are equal, round, and reactive to light.   Neck: Normal range of motion. Neck supple.   Cardiovascular: Normal rate and regular rhythm.    Pulmonary/Chest: Effort normal and breath sounds normal.   Abdominal: Soft. Bowel sounds are normal.   Musculoskeletal: Normal range of motion.   Neurological: He is alert and oriented to person, place, and time.   Skin: Skin is warm and dry.   Psychiatric: He has a normal mood and affect.        Significant Labs: All  pertinent labs within the past 24 hours have been reviewed.    Significant Imaging: I have reviewed and interpreted all pertinent imaging results/findings within the past 24 hours.    Assessment/Plan:     * Acute renal failure    Likely prerenal due to volume depletion with complication of rhabdomyolysis contributing to the failure. Will treat with aggressive IVF, follow CPK and check renal U/S.        Rhabdomyolysis    Pt with heavy lifting with concomitant dehydration due to insensible and GI loss, treat with aggressive IVF and follow CPK. Levels are improving.        Leukocytosis    Likely leukemoid reaction, monitor.        Tobacco abuse    Smoking cessation counseling done.        Seizures    No seizures since childhood.          VTE Risk Mitigation         Ordered     heparin (porcine) injection 5,000 Units  Every 8 hours     Route:  Subcutaneous        07/19/17 0759     Medium Risk of VTE  Once      07/19/17 0759     Place sequential compression device  Until discontinued      07/19/17 0759     Place DEANA hose  Until discontinued      07/19/17 0759        Sheryl Nesbitt MD  Department of Hospital Medicine   [facility]

## 2017-07-20 NOTE — ASSESSMENT & PLAN NOTE
Likely prerenal due to volume depletion with complication of rhabdomyolysis contributing to the failure. Will treat with aggressive IVF, follow CPK and check renal U/S.

## 2017-07-20 NOTE — HOSPITAL COURSE
Mr. Daily was admitted with ARF primarily due to volume depletion and with some component of rhabdomyolysis.  Pt was treated with aggressive IVF and close monitoring of CPK and renal function. All of the patient's symptoms resolved and his BUN/creatinine greatly improved faster than expected from a peak of 51/6.2 to 36/1.7 by the time of discharge. His CPK trended down from 2933 to 1169, and he was making clear urine. Anion GAP acidosis resolved. Leukocytosis was due to leukemoid reaction and was resolving without any intervention; he was AF and had no focus of infection. Pt was encouraged high fluid intake and rest, and told to avoid NSAIDS. Pt was arranged f/u with his PCP.

## 2017-07-20 NOTE — ASSESSMENT & PLAN NOTE
Pt with heavy lifting with concomitant dehydration due to insensible and GI loss, treat with aggressive IVF and follow CPK. Levels are improving.

## 2017-07-20 NOTE — DISCHARGE SUMMARY
"[facility]  Discharge Summary      Patient Name: Dedrick Daily  MRN: [id number]  Admission Date: 7/19/2017  Hospital Length of Stay: 1 days  Discharge Date and Time:  07/20/2017 6:34 PM  Attending Physician: Sheryl Nesbitt MD   Discharging Provider: Sheryl Nesbitt MD  Primary Care Provider: Norah Pimentel MD      HPI:   41 y/o male with +TOB who presented with worsening weakness with N/V since 7/17/17. He works in a hot warehouse lifting boxes, and he reported doing this 5 days in a row and on Monday he started having the symptoms so bad that he stayed home from work. He tried to drink and eat but could not keep anything down. He tries to drink at work since he sweats so much and he thought he was keeping up. He also reported diarrhea and he reported worsening of symptoms after eating fried chicken. No sick contacts. He reported darkening and decreasing urine output, muscle "cramping" everywhere. In the ER, workup was remarkable for BUN/creatinine of 51/6.2 and CPK of 2933.    * No surgery found *      Indwelling Lines/Drains at time of discharge:   Lines/Drains/Airways          No matching active lines, drains, or airways        Hospital Course:   Mr. Daily was admitted with ARF primarily due to volume depletion and with some component of rhabdomyolysis.  Pt was treated with aggressive IVF and close monitoring of CPK and renal function. All of the patient's symptoms resolved and his BUN/creatinine greatly improved faster than expected from a peak of 51/6.2 to 36/1.7 by the time of discharge. His CPK trended down from 2933 to 1169, and he was making clear urine. Anion GAP acidosis resolved. Leukocytosis was due to leukemoid reaction and was resolving without any intervention; he was AF and had no focus of infection. Pt was encouraged high fluid intake and rest, and told to avoid NSAIDS. Pt was arranged f/u with his PCP.     Consults: None    Significant Diagnostic " Studies:   Renal U/S (7/19/17):  Septated left renal cyst.  Increased echogenicity to the renal parenchyma possibly from medical renal disease.  The rest of examination is unremarkable.  Pending Diagnostic Studies:     None        Final Active Diagnoses:    Diagnosis Date Noted POA    Leukocytosis [D72.829] 07/19/2017 Yes    Seizures [R56.9] 07/19/2017 Yes    Tobacco abuse [Z72.0] 07/19/2017 Yes      Problems Resolved During this Admission:    Diagnosis Date Noted Date Resolved POA    PRINCIPAL PROBLEM:  Acute renal failure [N17.9] 07/19/2017 07/20/2017 Yes    Rhabdomyolysis [M62.82] 07/19/2017 07/20/2017 Yes      Discharged Condition: good    Disposition: Home or Self Care    Follow Up:  Follow-up Information     [facility]. Go on 7/25/2017.    Why:  Outpatient Services, PCP Follow-up Appointment, You will see Dr. Norah Pimentel, Please arrive to clinic for 1:30PM and bring picture ID, Medicaid Card  Contact information:  [address] [facility] [address]  [phone]                 Patient Instructions:     Diet general   Order Comments: Regular with high fluid intake for 1 week.  No NSAIDS.     Activity as tolerated       Medications:  Reconciled Home Medications:   Current Discharge Medication List      START taking these medications    Details   acetaminophen (TYLENOL) 325 MG tablet Take 2 tablets (650 mg total) by mouth every 8 (eight) hours as needed.  Refills: 0         STOP taking these medications       ibuprofen (ADVIL,MOTRIN) 600 MG tablet Comments:   Reason for Stopping:         ibuprofen (ADVIL,MOTRIN) 600 MG tablet Comments:   Reason for Stopping:             Time spent on the discharge of patient: 35 minutes    Sheryl Nesbitt MD  Department of Hospital Medicine  [facility]

## 2017-07-20 NOTE — PROGRESS NOTES
Follow-up Information     [facility]. Go on 7/25/2017.    Why:  Outpatient Services, PCP Follow-up Appointment, You will see Dr. Norah Pimentel, Please arrive to clinic for 1:30PM and bring picture ID, Medicaid Card  Contact information:  [address]  [place] [address]  [phone]                   [facility]    WRITTEN HEALTHCARE AND DISCHARGE INFORMATION                            Help at Home           [phone]  After discharge for assistance [facility] On Call Nurse Care Line 24/7  Assistance    Things You are responsible For To Manage Your Care At Home:  1.    Getting your prescriptions filled   2.    Taking your medications as directed, DO NOT MISS ANY DOSES!  3.    Going to your follow-up doctor appointment. This is important because it  allow the doctor to monitor your progress and determine if  any changes need to made to your treatment plan.     Thank you for choosing [facility] for your care.  Please answer any calls you may receive from [facility] we want to continue to support you as you manage your healthcare needs. [facility] is happy to have the opportunity to serve you.     Sincerely,  Your [facility] Team,  JENNIFER Reddy   II  [phone]

## 2017-07-21 ENCOUNTER — PATIENT OUTREACH (OUTPATIENT)
Dept: ADMINISTRATIVE | Facility: CLINIC | Age: 42
End: 2017-07-21

## 2017-07-21 NOTE — PATIENT INSTRUCTIONS
Renal Insufficiency  The role of the kidneys is to remove waste products and excess water from the body. When the kidneys do not function normally, waste products build up in the blood. The early stage of this process is called renal insufficiency. If renal insufficiency worsens it can lead to chronic renal failure. This allows excess water, waste and toxic substances to build up in the body. This can become a threat to life, requiring dialysis or a kidney transplant to stay alive.  Diabetes is the leading causes of renal insufficiency. Other causes include high blood pressure, hardening of the arteries, lupus, inflammation of the blood vessels (vasculitis), prior viral and bacterial infections, and others. Certain over-the-counter pain medicines can cause renal failure when taken often over a long period of time. These include aspirin, ibuprofen (Advil, Motrin) and related anti-inflammatory medicines.  Home Care:  If you have diabetes, talk to your doctor about the quality of your blood sugar control. Ask about any changes needed to your diet or medicines.  If you have high blood pressure:  Take your blood pressure medicine.  Take up a regular exercise program that you enjoy. Check with your doctor to be sure your planned exercise program is right for you.  Reduce your salt (sodium) intake. Your doctor can tell you how much salt per day is safe for you.  If you are overweight, talk to your doctor about a weight loss plan.  If you smoke, you must quit. Smoking worsens kidney disease. Talk to your doctor about ways to help you quit. For more information, visit the following links:  www.smokefree.gov/pubs/clearing_the_air.pdf  www.smokefree.gov  www.quitnet.com  Talk to your doctor about any dietary restrictions advised. In general, it is advisable to limit protein, salt, potassium and phosphorus. Avoid excess fluids. Do not add salt at the table and avoid salty foods. A calcium supplement may be prescribed to  protect your bones from osteoporosis.  Avoid the following over the counter medicines, or consult your doctor before using:  Aspirin and anti-inflammatory drugs such as ibuprofen (Advil, Motrin), naprosyn (Aleve); [Short term use of acetaminophen (Tylenol) for fever or pain is okay.]  Laxatives and antacids containing magnesium or aluminum (Mylanta, Maalox)  Avoid Fleet or phosphosoda enemas which contain phosphorus  Certain stomach acid-blocking medicine such as cimetidine (Tagamet), ranitidine (Zantac)  Decongestants containing pseudoephedrine (such as some forms of Sudafed or Actifed)  Herbal supplements  Follow Up  with your doctor or as advised by our staff. Contact one of the following for more information.  American Association of Kidney Patients [phone] [phone] www.aakp.org  National Kidney Foundation [phone] 886-7164 www.kidney.org  Return Promptly  or contact your doctor if any of the following occurs:  Nausea or vomiting  Severe weakness, dizziness, fainting, drowsiness or confusion  Chest pain or shortness of breath  Unexpected weight gain or swelling in the legs, ankles or around the eyes  Heart beating fast, slow or irregularly  Decrease or loss in urine output  © 8945-8977 [employer] \[facility]\"", [address] [place] [address], [place] [address]. All rights reserved. This information is not intended as a substitute for professional medical care. Always follow your healthcare professional's instructions.

## 2018-02-06 ENCOUNTER — CLINICAL SUPPORT (OUTPATIENT)
Dept: SMOKING CESSATION | Facility: CLINIC | Age: 43
End: 2018-02-06
Payer: COMMERCIAL

## 2018-02-06 DIAGNOSIS — F17.200 NICOTINE DEPENDENCE: Primary | ICD-10-CM

## 2018-02-06 PROCEDURE — 99404 PREV MED CNSL INDIV APPRX 60: CPT | Mod: S$GLB,,,

## 2018-02-06 PROCEDURE — 99999 PR PBB SHADOW E&M-EST. PATIENT-LVL I: CPT | Mod: PBBFAC,,,

## 2018-02-06 RX ORDER — IBUPROFEN 200 MG
1 TABLET ORAL DAILY
Qty: 14 PATCH | Refills: 0 | Status: SHIPPED | OUTPATIENT
Start: 2018-02-06 | End: 2018-02-20

## 2018-02-14 ENCOUNTER — CLINICAL SUPPORT (OUTPATIENT)
Dept: SMOKING CESSATION | Facility: CLINIC | Age: 43
End: 2018-02-14
Payer: COMMERCIAL

## 2018-02-14 DIAGNOSIS — F17.200 NICOTINE DEPENDENCE: Primary | ICD-10-CM

## 2018-02-14 PROCEDURE — 99407 BEHAV CHNG SMOKING > 10 MIN: CPT | Mod: S$GLB,,,

## 2018-02-26 ENCOUNTER — CLINICAL SUPPORT (OUTPATIENT)
Dept: SMOKING CESSATION | Facility: CLINIC | Age: 43
End: 2018-02-26
Payer: COMMERCIAL

## 2018-02-26 DIAGNOSIS — F17.200 NICOTINE DEPENDENCE: Primary | ICD-10-CM

## 2018-02-26 PROCEDURE — 99407 BEHAV CHNG SMOKING > 10 MIN: CPT | Mod: S$GLB,,,

## 2018-05-14 ENCOUNTER — TELEPHONE (OUTPATIENT)
Dept: SMOKING CESSATION | Facility: CLINIC | Age: 43
End: 2018-05-14

## 2018-05-22 ENCOUNTER — TELEPHONE (OUTPATIENT)
Dept: SMOKING CESSATION | Facility: CLINIC | Age: 43
End: 2018-05-22

## 2018-06-21 ENCOUNTER — CLINICAL SUPPORT (OUTPATIENT)
Dept: SMOKING CESSATION | Facility: CLINIC | Age: 43
End: 2018-06-21
Payer: COMMERCIAL

## 2018-06-21 DIAGNOSIS — F17.200 NICOTINE DEPENDENCE: Primary | ICD-10-CM

## 2018-06-21 PROCEDURE — 99407 BEHAV CHNG SMOKING > 10 MIN: CPT | Mod: S$GLB,,,

## 2018-06-21 NOTE — PROGRESS NOTES
Spoke with patient today in regards to smoking cessation progress 3 month phone follow up, states not tobacco free. Patient stated he will call back to schedule an appointment. Informed patient of benefit period, phone follow up at 6 & 12 month follow up, and contact information. Will complete smart form for 3 month phone follow up on Quit attempt #1.

## 2019-02-20 ENCOUNTER — TELEPHONE (OUTPATIENT)
Dept: SMOKING CESSATION | Facility: CLINIC | Age: 44
End: 2019-02-20

## 2019-03-18 ENCOUNTER — TELEPHONE (OUTPATIENT)
Dept: SMOKING CESSATION | Facility: CLINIC | Age: 44
End: 2019-03-18

## 2019-03-18 NOTE — TELEPHONE ENCOUNTER
2nd attempt unable to leave message regarding smoking cessation quit 1 episode 12 month follow up.

## 2021-04-14 ENCOUNTER — HOSPITAL ENCOUNTER (EMERGENCY)
Facility: HOSPITAL | Age: 46
Discharge: HOME | End: 2021-04-14
Attending: EMERGENCY MEDICINE
Payer: MEDICAID

## 2021-04-14 VITALS
HEART RATE: 62 BPM | OXYGEN SATURATION: 98 % | TEMPERATURE: 98 F | RESPIRATION RATE: 18 BRPM | HEIGHT: 69 IN | SYSTOLIC BLOOD PRESSURE: 143 MMHG | WEIGHT: 180 LBS | BODY MASS INDEX: 26.66 KG/M2 | DIASTOLIC BLOOD PRESSURE: 84 MMHG

## 2021-04-14 DIAGNOSIS — S39.012A BACK STRAIN, INITIAL ENCOUNTER: ICD-10-CM

## 2021-04-14 DIAGNOSIS — R03.0 ELEVATED BLOOD PRESSURE READING WITHOUT DIAGNOSIS OF HYPERTENSION: Primary | ICD-10-CM

## 2021-04-14 PROCEDURE — 25000003 PHARM REV CODE 250: Mod: ER | Performed by: EMERGENCY MEDICINE

## 2021-04-14 PROCEDURE — 63600175 PHARM REV CODE 636 W HCPCS: Mod: ER | Performed by: EMERGENCY MEDICINE

## 2021-04-14 PROCEDURE — 99284 EMERGENCY DEPT VISIT MOD MDM: CPT | Mod: 25,ER

## 2021-04-14 PROCEDURE — 96372 THER/PROPH/DIAG INJ SC/IM: CPT | Mod: ER

## 2021-04-14 RX ORDER — TRAMADOL HYDROCHLORIDE AND ACETAMINOPHEN 37.5; 325 MG/1; MG/1
1 TABLET, FILM COATED ORAL EVERY 6 HOURS PRN
Qty: 9 TABLET | Refills: 0 | Status: SHIPPED | OUTPATIENT
Start: 2021-04-14

## 2021-04-14 RX ORDER — KETOROLAC TROMETHAMINE 30 MG/ML
60 INJECTION, SOLUTION INTRAMUSCULAR; INTRAVENOUS
Status: DISCONTINUED | OUTPATIENT
Start: 2021-04-14 | End: 2021-04-14

## 2021-04-14 RX ORDER — KETOROLAC TROMETHAMINE 30 MG/ML
60 INJECTION, SOLUTION INTRAMUSCULAR; INTRAVENOUS
Status: COMPLETED | OUTPATIENT
Start: 2021-04-14 | End: 2021-04-14

## 2021-04-14 RX ORDER — OXYCODONE AND ACETAMINOPHEN 5; 325 MG/1; MG/1
2 TABLET ORAL
Status: COMPLETED | OUTPATIENT
Start: 2021-04-14 | End: 2021-04-14

## 2021-04-14 RX ADMIN — KETOROLAC TROMETHAMINE 60 MG: 30 INJECTION, SOLUTION INTRAMUSCULAR at 04:04

## 2021-04-14 RX ADMIN — OXYCODONE HYDROCHLORIDE AND ACETAMINOPHEN 2 TABLET: 5; 325 TABLET ORAL at 04:04

## 2021-04-18 ENCOUNTER — HOSPITAL ENCOUNTER (EMERGENCY)
Facility: HOSPITAL | Age: 46
Discharge: HOME | End: 2021-04-18
Attending: EMERGENCY MEDICINE
Payer: MEDICAID

## 2021-04-18 VITALS
RESPIRATION RATE: 16 BRPM | DIASTOLIC BLOOD PRESSURE: 93 MMHG | HEIGHT: 69 IN | OXYGEN SATURATION: 100 % | BODY MASS INDEX: 26.66 KG/M2 | WEIGHT: 180 LBS | SYSTOLIC BLOOD PRESSURE: 168 MMHG | TEMPERATURE: 98 F | HEART RATE: 50 BPM

## 2021-04-18 DIAGNOSIS — M54.50 ACUTE BILATERAL LOW BACK PAIN WITHOUT SCIATICA: Primary | ICD-10-CM

## 2021-04-18 PROCEDURE — 99284 PR EMERGENCY DEPT VISIT,LEVEL IV: ICD-10-PCS | Mod: ,,, | Performed by: EMERGENCY MEDICINE

## 2021-04-18 PROCEDURE — 99284 EMERGENCY DEPT VISIT MOD MDM: CPT | Mod: 25

## 2021-04-18 PROCEDURE — 96372 THER/PROPH/DIAG INJ SC/IM: CPT

## 2021-04-18 PROCEDURE — 63600175 PHARM REV CODE 636 W HCPCS: Performed by: EMERGENCY MEDICINE

## 2021-04-18 PROCEDURE — 99284 EMERGENCY DEPT VISIT MOD MDM: CPT | Mod: ,,, | Performed by: EMERGENCY MEDICINE

## 2021-04-18 PROCEDURE — 25000003 PHARM REV CODE 250: Performed by: EMERGENCY MEDICINE

## 2021-04-18 RX ORDER — NAPROXEN 375 MG/1
375 TABLET ORAL 2 TIMES DAILY WITH MEALS
Qty: 60 TABLET | Refills: 0 | Status: SHIPPED | OUTPATIENT
Start: 2021-04-18

## 2021-04-18 RX ORDER — LIDOCAINE 50 MG/G
1 PATCH TOPICAL DAILY
Qty: 5 PATCH | Refills: 0 | Status: SHIPPED | OUTPATIENT
Start: 2021-04-18 | End: 2021-04-23

## 2021-04-18 RX ORDER — LIDOCAINE 50 MG/G
1 PATCH TOPICAL
Status: DISCONTINUED | OUTPATIENT
Start: 2021-04-18 | End: 2021-04-18 | Stop reason: HOSPADM

## 2021-04-18 RX ORDER — METHOCARBAMOL 500 MG/1
1000 TABLET, FILM COATED ORAL 3 TIMES DAILY
Qty: 30 TABLET | Refills: 0 | Status: SHIPPED | OUTPATIENT
Start: 2021-04-18 | End: 2021-04-23

## 2021-04-18 RX ORDER — KETOROLAC TROMETHAMINE 30 MG/ML
15 INJECTION, SOLUTION INTRAMUSCULAR; INTRAVENOUS
Status: COMPLETED | OUTPATIENT
Start: 2021-04-18 | End: 2021-04-18

## 2021-04-18 RX ORDER — DIAZEPAM 5 MG/1
5 TABLET ORAL
Status: COMPLETED | OUTPATIENT
Start: 2021-04-18 | End: 2021-04-18

## 2021-04-18 RX ADMIN — LIDOCAINE 1 PATCH: 50 PATCH TOPICAL at 12:04

## 2021-04-18 RX ADMIN — DIAZEPAM 5 MG: 5 TABLET ORAL at 12:04

## 2021-04-18 RX ADMIN — KETOROLAC TROMETHAMINE 15 MG: 30 INJECTION, SOLUTION INTRAMUSCULAR; INTRAVENOUS at 12:04

## 2021-04-19 ENCOUNTER — TELEPHONE (OUTPATIENT)
Dept: EMERGENCY MEDICINE | Facility: HOSPITAL | Age: 46
End: 2021-04-19

## 2021-07-02 ENCOUNTER — HOSPITAL ENCOUNTER (EMERGENCY)
Facility: HOSPITAL | Age: 46
Discharge: HOME | End: 2021-07-02
Attending: EMERGENCY MEDICINE
Payer: MEDICAID

## 2021-07-02 VITALS
HEART RATE: 64 BPM | SYSTOLIC BLOOD PRESSURE: 122 MMHG | TEMPERATURE: 99 F | RESPIRATION RATE: 16 BRPM | HEIGHT: 69 IN | OXYGEN SATURATION: 96 % | BODY MASS INDEX: 27.99 KG/M2 | DIASTOLIC BLOOD PRESSURE: 60 MMHG | WEIGHT: 189 LBS

## 2021-07-02 DIAGNOSIS — N30.90 CYSTITIS: Primary | ICD-10-CM

## 2021-07-02 LAB
ALBUMIN SERPL BCP-MCNC: 3.7 G/DL (ref 3.5–5.2)
ALP SERPL-CCNC: 72 U/L (ref 55–135)
ALT SERPL W/O P-5'-P-CCNC: 13 U/L (ref 10–44)
ANION GAP SERPL CALC-SCNC: 11 MMOL/L (ref 8–16)
AST SERPL-CCNC: 17 U/L (ref 10–40)
BACTERIA #/AREA URNS AUTO: ABNORMAL /HPF
BASOPHILS # BLD AUTO: 0.07 K/UL (ref 0–0.2)
BASOPHILS NFR BLD: 0.7 % (ref 0–1.9)
BILIRUB SERPL-MCNC: 0.3 MG/DL (ref 0.1–1)
BILIRUB UR QL STRIP: NEGATIVE
BUN SERPL-MCNC: 10 MG/DL (ref 6–20)
CALCIUM SERPL-MCNC: 9.1 MG/DL (ref 8.7–10.5)
CHLORIDE SERPL-SCNC: 103 MMOL/L (ref 95–110)
CLARITY UR REFRACT.AUTO: ABNORMAL
CO2 SERPL-SCNC: 27 MMOL/L (ref 23–29)
COLOR UR AUTO: YELLOW
CREAT SERPL-MCNC: 1 MG/DL (ref 0.5–1.4)
DIFFERENTIAL METHOD: ABNORMAL
EOSINOPHIL # BLD AUTO: 0.4 K/UL (ref 0–0.5)
EOSINOPHIL NFR BLD: 3.5 % (ref 0–8)
ERYTHROCYTE [DISTWIDTH] IN BLOOD BY AUTOMATED COUNT: 13.3 % (ref 11.5–14.5)
EST. GFR  (AFRICAN AMERICAN): >60 ML/MIN/1.73 M^2
EST. GFR  (NON AFRICAN AMERICAN): >60 ML/MIN/1.73 M^2
GLUCOSE SERPL-MCNC: 92 MG/DL (ref 70–110)
GLUCOSE UR QL STRIP: NEGATIVE
HCT VFR BLD AUTO: 41.4 % (ref 40–54)
HCV AB SERPL QL IA: NEGATIVE
HGB BLD-MCNC: 13.3 G/DL (ref 14–18)
HGB UR QL STRIP: NEGATIVE
HIV 1+2 AB+HIV1 P24 AG SERPL QL IA: NEGATIVE
IMM GRANULOCYTES # BLD AUTO: 0.03 K/UL (ref 0–0.04)
IMM GRANULOCYTES NFR BLD AUTO: 0.3 % (ref 0–0.5)
KETONES UR QL STRIP: ABNORMAL
LEUKOCYTE ESTERASE UR QL STRIP: NEGATIVE
LYMPHOCYTES # BLD AUTO: 2.8 K/UL (ref 1–4.8)
LYMPHOCYTES NFR BLD: 27.6 % (ref 18–48)
MCH RBC QN AUTO: 29.5 PG (ref 27–31)
MCHC RBC AUTO-ENTMCNC: 32.1 G/DL (ref 32–36)
MCV RBC AUTO: 92 FL (ref 82–98)
MICROSCOPIC COMMENT: ABNORMAL
MONOCYTES # BLD AUTO: 0.9 K/UL (ref 0.3–1)
MONOCYTES NFR BLD: 8.6 % (ref 4–15)
NEUTROPHILS # BLD AUTO: 6.1 K/UL (ref 1.8–7.7)
NEUTROPHILS NFR BLD: 59.3 % (ref 38–73)
NITRITE UR QL STRIP: NEGATIVE
NRBC BLD-RTO: 0 /100 WBC
PH UR STRIP: 5 [PH] (ref 5–8)
PLATELET # BLD AUTO: 206 K/UL (ref 150–450)
PMV BLD AUTO: 10.8 FL (ref 9.2–12.9)
POTASSIUM SERPL-SCNC: 4.3 MMOL/L (ref 3.5–5.1)
PROT SERPL-MCNC: 6.7 G/DL (ref 6–8.4)
PROT UR QL STRIP: NEGATIVE
RBC # BLD AUTO: 4.51 M/UL (ref 4.6–6.2)
RBC #/AREA URNS AUTO: 5 /HPF (ref 0–4)
SODIUM SERPL-SCNC: 141 MMOL/L (ref 136–145)
SP GR UR STRIP: 1.03 (ref 1–1.03)
SQUAMOUS #/AREA URNS AUTO: 2 /HPF
URN SPEC COLLECT METH UR: ABNORMAL
WBC # BLD AUTO: 10.26 K/UL (ref 3.9–12.7)
WBC #/AREA URNS AUTO: 7 /HPF (ref 0–5)

## 2021-07-02 PROCEDURE — 99283 EMERGENCY DEPT VISIT LOW MDM: CPT

## 2021-07-02 PROCEDURE — 81001 URINALYSIS AUTO W/SCOPE: CPT | Performed by: PHYSICIAN ASSISTANT

## 2021-07-02 PROCEDURE — 80053 COMPREHEN METABOLIC PANEL: CPT | Performed by: PHYSICIAN ASSISTANT

## 2021-07-02 PROCEDURE — 99284 EMERGENCY DEPT VISIT MOD MDM: CPT | Mod: ,,, | Performed by: PHYSICIAN ASSISTANT

## 2021-07-02 PROCEDURE — 86803 HEPATITIS C AB TEST: CPT | Performed by: EMERGENCY MEDICINE

## 2021-07-02 PROCEDURE — 87389 HIV-1 AG W/HIV-1&-2 AB AG IA: CPT | Performed by: EMERGENCY MEDICINE

## 2021-07-02 PROCEDURE — 85025 COMPLETE CBC W/AUTO DIFF WBC: CPT | Performed by: PHYSICIAN ASSISTANT

## 2021-07-02 PROCEDURE — 99284 PR EMERGENCY DEPT VISIT,LEVEL IV: ICD-10-PCS | Mod: ,,, | Performed by: PHYSICIAN ASSISTANT

## 2021-07-02 RX ORDER — CIPROFLOXACIN 500 MG/1
500 TABLET ORAL 2 TIMES DAILY
Qty: 20 TABLET | Refills: 0 | Status: SHIPPED | OUTPATIENT
Start: 2021-07-02 | End: 2021-07-12

## 2022-07-17 VITALS
HEART RATE: 59 BPM | RESPIRATION RATE: 18 BRPM | SYSTOLIC BLOOD PRESSURE: 120 MMHG | OXYGEN SATURATION: 98 % | DIASTOLIC BLOOD PRESSURE: 60 MMHG | TEMPERATURE: 99 F | BODY MASS INDEX: 30.29 KG/M2 | WEIGHT: 193 LBS | HEIGHT: 67 IN

## 2022-07-17 LAB
ALBUMIN SERPL BCP-MCNC: 3.2 G/DL (ref 3.5–5.2)
ALP SERPL-CCNC: 71 U/L (ref 55–135)
ALT SERPL W/O P-5'-P-CCNC: 21 U/L (ref 10–44)
ANION GAP SERPL CALC-SCNC: 7 MMOL/L (ref 8–16)
AST SERPL-CCNC: 15 U/L (ref 10–40)
BASOPHILS # BLD AUTO: 0.08 K/UL (ref 0–0.2)
BASOPHILS NFR BLD: 0.7 % (ref 0–1.9)
BILIRUB SERPL-MCNC: 0.1 MG/DL (ref 0.1–1)
BNP SERPL-MCNC: 103 PG/ML (ref 0–99)
BUN SERPL-MCNC: 13 MG/DL (ref 6–20)
CALCIUM SERPL-MCNC: 8.8 MG/DL (ref 8.7–10.5)
CHLORIDE SERPL-SCNC: 108 MMOL/L (ref 95–110)
CO2 SERPL-SCNC: 28 MMOL/L (ref 23–29)
CREAT SERPL-MCNC: 0.9 MG/DL (ref 0.5–1.4)
DIFFERENTIAL METHOD: ABNORMAL
EOSINOPHIL # BLD AUTO: 0.4 K/UL (ref 0–0.5)
EOSINOPHIL NFR BLD: 3.5 % (ref 0–8)
ERYTHROCYTE [DISTWIDTH] IN BLOOD BY AUTOMATED COUNT: 13.5 % (ref 11.5–14.5)
EST. GFR  (AFRICAN AMERICAN): >60 ML/MIN/1.73 M^2
EST. GFR  (NON AFRICAN AMERICAN): >60 ML/MIN/1.73 M^2
GLUCOSE SERPL-MCNC: 99 MG/DL (ref 70–110)
HCT VFR BLD AUTO: 40.4 % (ref 40–54)
HGB BLD-MCNC: 13.1 G/DL (ref 14–18)
IMM GRANULOCYTES # BLD AUTO: 0.04 K/UL (ref 0–0.04)
IMM GRANULOCYTES NFR BLD AUTO: 0.3 % (ref 0–0.5)
LYMPHOCYTES # BLD AUTO: 3.8 K/UL (ref 1–4.8)
LYMPHOCYTES NFR BLD: 32.4 % (ref 18–48)
MCH RBC QN AUTO: 30.7 PG (ref 27–31)
MCHC RBC AUTO-ENTMCNC: 32.4 G/DL (ref 32–36)
MCV RBC AUTO: 95 FL (ref 82–98)
MONOCYTES # BLD AUTO: 0.9 K/UL (ref 0.3–1)
MONOCYTES NFR BLD: 7.7 % (ref 4–15)
NEUTROPHILS # BLD AUTO: 6.4 K/UL (ref 1.8–7.7)
NEUTROPHILS NFR BLD: 55.4 % (ref 38–73)
NRBC BLD-RTO: 0 /100 WBC
PLATELET # BLD AUTO: 227 K/UL (ref 150–450)
PMV BLD AUTO: 10.4 FL (ref 9.2–12.9)
POTASSIUM SERPL-SCNC: 4.3 MMOL/L (ref 3.5–5.1)
PROT SERPL-MCNC: 5.9 G/DL (ref 6–8.4)
RBC # BLD AUTO: 4.27 M/UL (ref 4.6–6.2)
SODIUM SERPL-SCNC: 143 MMOL/L (ref 136–145)
WBC # BLD AUTO: 11.64 K/UL (ref 3.9–12.7)

## 2022-07-17 PROCEDURE — 83880 ASSAY OF NATRIURETIC PEPTIDE: CPT | Performed by: EMERGENCY MEDICINE

## 2022-07-17 PROCEDURE — 85025 COMPLETE CBC W/AUTO DIFF WBC: CPT | Performed by: EMERGENCY MEDICINE

## 2022-07-17 PROCEDURE — 84484 ASSAY OF TROPONIN QUANT: CPT | Performed by: EMERGENCY MEDICINE

## 2022-07-17 PROCEDURE — 99900041 HC LEFT WITHOUT BEING SEEN- EMERGENCY

## 2022-07-17 PROCEDURE — 80053 COMPREHEN METABOLIC PANEL: CPT | Performed by: EMERGENCY MEDICINE

## 2022-07-18 ENCOUNTER — HOSPITAL ENCOUNTER (EMERGENCY)
Facility: HOSPITAL | Age: 47
Discharge: LEFT BEFORE DISCHARGE | End: 2022-07-18
Attending: EMERGENCY MEDICINE
Payer: MEDICAID

## 2022-07-18 DIAGNOSIS — R60.0 BILATERAL LOWER EXTREMITY EDEMA: ICD-10-CM

## 2022-07-18 DIAGNOSIS — M79.89 LEG SWELLING: Primary | ICD-10-CM

## 2022-07-18 LAB — TROPONIN I SERPL DL<=0.01 NG/ML-MCNC: <0.006 NG/ML (ref 0–0.03)

## 2022-07-18 NOTE — ED PROVIDER NOTES
Encounter Date: 7/17/2022       History     Chief Complaint   Patient presents with    Leg Swelling     Complaining of bilateral leg swelling from the knee down to feet.  States he noticed this a month ago and swelling lasted a few days and went down.  Swelling started again last night.     Patient left prior to being seen by physician.         Review of patient's allergies indicates:  No Known Allergies  Past Medical History:   Diagnosis Date    Heart murmur     Seizures     as a child till 14 yrs old.     Past Surgical History:   Procedure Laterality Date    circumcised       Family History   Problem Relation Age of Onset    Cancer Mother         Lung CA    Cancer Father         Prostate CA     Social History     Tobacco Use    Smoking status: Current Every Day Smoker     Packs/day: 0.50    Smokeless tobacco: Never Used   Substance Use Topics    Alcohol use: Yes     Comment: occasional    Drug use: Yes     Types: Marijuana     Comment: heroine 2 years ago     Review of Systems    Physical Exam     Initial Vitals [07/17/22 1950]   BP Pulse Resp Temp SpO2   120/60 (!) 59 18 99 °F (37.2 °C) 98 %      MAP       --         Physical Exam    ED Course   Procedures  Labs Reviewed   CBC W/ AUTO DIFFERENTIAL - Abnormal; Notable for the following components:       Result Value    RBC 4.27 (*)     Hemoglobin 13.1 (*)     All other components within normal limits   COMPREHENSIVE METABOLIC PANEL - Abnormal; Notable for the following components:    Total Protein 5.9 (*)     Albumin 3.2 (*)     Anion Gap 7 (*)     All other components within normal limits   B-TYPE NATRIURETIC PEPTIDE - Abnormal; Notable for the following components:     (*)     All other components within normal limits   TROPONIN I          Imaging Results    None          Medications - No data to display                       Clinical Impression:   Final diagnoses:  [R60.0] Bilateral lower extremity edema                 Enrique Boateng,  MD  07/18/22 0155

## 2022-07-25 ENCOUNTER — HOSPITAL ENCOUNTER (EMERGENCY)
Facility: HOSPITAL | Age: 47
Discharge: HOME | End: 2022-07-25
Attending: EMERGENCY MEDICINE
Payer: MEDICAID

## 2022-07-25 VITALS
HEART RATE: 58 BPM | WEIGHT: 192 LBS | TEMPERATURE: 98 F | SYSTOLIC BLOOD PRESSURE: 122 MMHG | OXYGEN SATURATION: 100 % | HEIGHT: 67 IN | BODY MASS INDEX: 30.13 KG/M2 | DIASTOLIC BLOOD PRESSURE: 61 MMHG | RESPIRATION RATE: 20 BRPM

## 2022-07-25 DIAGNOSIS — R60.0 BILATERAL LOWER EXTREMITY EDEMA: Primary | ICD-10-CM

## 2022-07-25 LAB
ALBUMIN SERPL BCP-MCNC: 3.3 G/DL (ref 3.5–5.2)
ALP SERPL-CCNC: 81 U/L (ref 55–135)
ALT SERPL W/O P-5'-P-CCNC: 22 U/L (ref 10–44)
ANION GAP SERPL CALC-SCNC: 5 MMOL/L (ref 8–16)
AST SERPL-CCNC: 17 U/L (ref 10–40)
BASOPHILS # BLD AUTO: 0.04 K/UL (ref 0–0.2)
BASOPHILS NFR BLD: 0.4 % (ref 0–1.9)
BILIRUB SERPL-MCNC: 0.3 MG/DL (ref 0.1–1)
BILIRUB UR QL STRIP: NEGATIVE
BNP SERPL-MCNC: 67 PG/ML (ref 0–99)
BUN SERPL-MCNC: 10 MG/DL (ref 6–20)
CALCIUM SERPL-MCNC: 9.2 MG/DL (ref 8.7–10.5)
CHLORIDE SERPL-SCNC: 99 MMOL/L (ref 95–110)
CK SERPL-CCNC: 159 U/L (ref 20–200)
CLARITY UR REFRACT.AUTO: CLEAR
CO2 SERPL-SCNC: 32 MMOL/L (ref 23–29)
COLOR UR AUTO: YELLOW
CREAT SERPL-MCNC: 0.9 MG/DL (ref 0.5–1.4)
DIFFERENTIAL METHOD: ABNORMAL
EOSINOPHIL # BLD AUTO: 0.3 K/UL (ref 0–0.5)
EOSINOPHIL NFR BLD: 2.4 % (ref 0–8)
ERYTHROCYTE [DISTWIDTH] IN BLOOD BY AUTOMATED COUNT: 13.3 % (ref 11.5–14.5)
EST. GFR  (AFRICAN AMERICAN): >60 ML/MIN/1.73 M^2
EST. GFR  (NON AFRICAN AMERICAN): >60 ML/MIN/1.73 M^2
GLUCOSE SERPL-MCNC: 92 MG/DL (ref 70–110)
GLUCOSE UR QL STRIP: NEGATIVE
HCT VFR BLD AUTO: 41.2 % (ref 40–54)
HGB BLD-MCNC: 13.6 G/DL (ref 14–18)
HGB UR QL STRIP: NEGATIVE
IMM GRANULOCYTES # BLD AUTO: 0.04 K/UL (ref 0–0.04)
IMM GRANULOCYTES NFR BLD AUTO: 0.4 % (ref 0–0.5)
KETONES UR QL STRIP: NEGATIVE
LEUKOCYTE ESTERASE UR QL STRIP: NEGATIVE
LYMPHOCYTES # BLD AUTO: 2.4 K/UL (ref 1–4.8)
LYMPHOCYTES NFR BLD: 22.4 % (ref 18–48)
MCH RBC QN AUTO: 30.2 PG (ref 27–31)
MCHC RBC AUTO-ENTMCNC: 33 G/DL (ref 32–36)
MCV RBC AUTO: 91 FL (ref 82–98)
MONOCYTES # BLD AUTO: 0.7 K/UL (ref 0.3–1)
MONOCYTES NFR BLD: 6.8 % (ref 4–15)
NEUTROPHILS # BLD AUTO: 7.1 K/UL (ref 1.8–7.7)
NEUTROPHILS NFR BLD: 67.6 % (ref 38–73)
NITRITE UR QL STRIP: NEGATIVE
NRBC BLD-RTO: 0 /100 WBC
PH UR STRIP: 6 [PH] (ref 5–8)
PLATELET # BLD AUTO: 244 K/UL (ref 150–450)
PMV BLD AUTO: 10.6 FL (ref 9.2–12.9)
POTASSIUM SERPL-SCNC: 3.9 MMOL/L (ref 3.5–5.1)
PROT SERPL-MCNC: 6.2 G/DL (ref 6–8.4)
PROT UR QL STRIP: NEGATIVE
RBC # BLD AUTO: 4.51 M/UL (ref 4.6–6.2)
SODIUM SERPL-SCNC: 136 MMOL/L (ref 136–145)
SP GR UR STRIP: 1.02 (ref 1–1.03)
TROPONIN I SERPL DL<=0.01 NG/ML-MCNC: <0.006 NG/ML (ref 0–0.03)
URN SPEC COLLECT METH UR: NORMAL
WBC # BLD AUTO: 10.51 K/UL (ref 3.9–12.7)

## 2022-07-25 PROCEDURE — 99283 EMERGENCY DEPT VISIT LOW MDM: CPT | Mod: 25

## 2022-07-25 PROCEDURE — 81003 URINALYSIS AUTO W/O SCOPE: CPT | Performed by: EMERGENCY MEDICINE

## 2022-07-25 PROCEDURE — 82550 ASSAY OF CK (CPK): CPT | Performed by: EMERGENCY MEDICINE

## 2022-07-25 PROCEDURE — 84484 ASSAY OF TROPONIN QUANT: CPT

## 2022-07-25 PROCEDURE — 80053 COMPREHEN METABOLIC PANEL: CPT | Performed by: EMERGENCY MEDICINE

## 2022-07-25 PROCEDURE — 83880 ASSAY OF NATRIURETIC PEPTIDE: CPT | Performed by: EMERGENCY MEDICINE

## 2022-07-25 PROCEDURE — 85025 COMPLETE CBC W/AUTO DIFF WBC: CPT | Performed by: EMERGENCY MEDICINE

## 2022-07-25 PROCEDURE — 99282 EMERGENCY DEPT VISIT SF MDM: CPT | Mod: ,,, | Performed by: EMERGENCY MEDICINE

## 2022-07-25 PROCEDURE — 99282 PR EMERGENCY DEPT VISIT,LEVEL II: ICD-10-PCS | Mod: ,,, | Performed by: EMERGENCY MEDICINE

## 2022-07-25 PROCEDURE — 84484 ASSAY OF TROPONIN QUANT: CPT | Performed by: EMERGENCY MEDICINE

## 2022-07-25 NOTE — ED PROVIDER NOTES
Encounter Date: 7/25/2022    SCRIBE #1 NOTE: I, Rekha Lares, am scribing for, and in the presence of,  Frances Young MD. I have scribed the entire note.       History     Chief Complaint   Patient presents with    Leg Pain     Patient complains of leg swelling and pain x 1 week.      Time patient was seen by the provider: 2:49 PM      The patient is a 47 y.o. male with co-morbidities including: heart murmur who presents to the ED with a complaint of bilateral leg swelling and pain with onset 1 week ago. He went to the ED for this a week ago but left without getting worked up. He denies any abdominal swelling but thinks he has gained weight. He denies any changes in his diet but does eat a lot of salt. He is active at work doing construction. He denies any chest pain, SOB, hematuria, or FHx of kidney problems. He came to the ED a few years ago after getting dehydrated and was diagnosed with GONZALO but did not have any kidney problems after that.    The history is provided by the patient and medical records. No  was used.     Review of patient's allergies indicates:  No Known Allergies  Past Medical History:   Diagnosis Date    Heart murmur     Seizures     as a child till 14 yrs old.     Past Surgical History:   Procedure Laterality Date    circumcised       Family History   Problem Relation Age of Onset    Cancer Mother         Lung CA    Cancer Father         Prostate CA     Social History     Tobacco Use    Smoking status: Current Every Day Smoker     Packs/day: 0.50    Smokeless tobacco: Never Used   Substance Use Topics    Alcohol use: Yes     Comment: occasional    Drug use: Yes     Types: Marijuana     Comment: heroine 2 years ago     Review of Systems   Constitutional: Negative for appetite change and fever.   HENT: Negative for sore throat.    Respiratory: Negative for shortness of breath.    Cardiovascular: Positive for leg swelling. Negative for chest pain.   Gastrointestinal:  Negative for abdominal distention and nausea.   Genitourinary: Negative for dysuria and hematuria.   Musculoskeletal: Positive for myalgias (bilateral legs). Negative for back pain.   Skin: Negative for rash.   Neurological: Negative for weakness.   Hematological: Does not bruise/bleed easily.       Physical Exam     Initial Vitals [07/25/22 1313]   BP Pulse Resp Temp SpO2   (!) 121/57 (!) 57 16 97.8 °F (36.6 °C) 97 %      MAP       --         Physical Exam    Nursing note and vitals reviewed.  Constitutional: He appears well-developed and well-nourished. He is not diaphoretic. No distress.   Eyes: Conjunctivae are normal. No scleral icterus.   Neck: Neck supple. No JVD present.   Cardiovascular: Normal rate and regular rhythm. Exam reveals no gallop and no friction rub.    No murmur heard.  DP pulses intact.   Pulmonary/Chest: Breath sounds normal. No respiratory distress. He has no wheezes.   Abdominal: Abdomen is soft. Bowel sounds are normal. There is no abdominal tenderness. There is no rebound and no guarding.   Musculoskeletal:         General: No tenderness. Normal range of motion.      Cervical back: Neck supple.      Comments: 2+ Edema to knees bilaterally. No calf asymmetry or tenderness.  nimesh's negative.     Neurological: He is alert and oriented to person, place, and time. GCS score is 15.   Skin: Skin is warm and dry. No rash noted. No erythema. No pallor.   Psychiatric: He has a normal mood and affect. His behavior is normal. Judgment and thought content normal.         ED Course   Procedures  Labs Reviewed   CBC W/ AUTO DIFFERENTIAL - Abnormal; Notable for the following components:       Result Value    RBC 4.51 (*)     Hemoglobin 13.6 (*)     All other components within normal limits    Narrative:     ADD ON CPK PER ERIC SANCHEZ RN PER ZAKIA RAMON MD ORDER# [id number]   @  07/25/2022  15:31    COMPREHENSIVE METABOLIC PANEL - Abnormal; Notable for the following components:    CO2 32 (*)      Albumin 3.3 (*)     Anion Gap 5 (*)     All other components within normal limits    Narrative:     ADD ON CPK PER ERIC SANCHEZ RN PER KAYLIN RAMON MD ORDER# [id number]   @  07/25/2022  15:31    B-TYPE NATRIURETIC PEPTIDE    Narrative:     ADD ON CPK PER ERIC SANCHEZ RN PER KAYLIN RAMON MD ORDER# [id number]   @  07/25/2022  15:31    TROPONIN I    Narrative:     ADD ON CPK PER ERIC SANCHEZ RN PER KAYLIN RAMON MD ORDER# [id number]   @  07/25/2022  15:31    URINALYSIS, REFLEX TO URINE CULTURE    Narrative:     Specimen Source->Urine   CK   CK    Narrative:     ADD ON CPK PER ERIC SANCHEZ RN PER KAYLIN RAMON MD ORDER# [id number]   @  07/25/2022  15:31           Imaging Results    None          Medications - No data to display  Medical Decision Making:   History:   Old Medical Records: I decided to obtain old medical records.  Initial Assessment:   Emergent evaluation of a 47 y.o. male with bilateral leg swelling x1 week. Vital signs stable, well appearing, no acute distress. 2+ edema to knees bilateral with no calf tenderness.   Differential Diagnosis:   Dependent edema, myositis, CHF, kidney dysfunction, liver dysfunction, nephrotic syndrome  Clinical Tests:   Lab Tests: Ordered and Reviewed  ED Management:  - Labs          Scribe Attestation:   Scribe #1: I performed the above scribed service and the documentation accurately describes the services I performed. I attest to the accuracy of the note.    Attending Attestation:           Physician Attestation for Scribe:  Physician Attestation Statement for Scribe #1: I, kaylin ramon MD, reviewed documentation, as scribed by in my presence, and it is both accurate and complete.             ED Course as of 07/26/22 0926   Mon Jul 25, 2022   1625 CPK: 159 [GM]   1625 Troponin I: <0.006 [GM]   1625 WBC: 10.51 [GM]   1625 BUN: 10 [GM]   1625 Creatinine: 0.9 [GM]      ED Course User Index  [GM] Kaylin Ramon MD         pt turned over to Dr. Petit in stable condition  pending UA and final dispo.    Clinical Impression:   Final diagnoses:  [R60.0] Bilateral lower extremity edema (Primary)          ED Disposition Condition    Discharge Stable        ED Prescriptions     None        Follow-up Information     Follow up With Specialties Details Why Contact Info    [facility] Emergency Medicine  If symptoms worsen [address]  [facility] [address]  [phone]           Frances Young MD  07/26/22 0926

## 2022-07-25 NOTE — DISCHARGE INSTRUCTIONS
Wear compression socks, elevated your legs above the level of your heart when youre at home.  Follow up with your primary doctor as you require further testing, like an ultrasound of your heart.

## 2022-07-25 NOTE — ED TRIAGE NOTES
Patient reports to the ED today with reports of Leg pain and swelling x several weeks. Swelling noted to both legs bilaterally    Patient identifiers verified and correct for Dedrick Daily  LOC: The patient is awake, alert and aware of environment with an appropriate affect, the patient is oriented x 3 and speaking appropriately.   APPEARANCE: Patient appears comfortable and in no acute distress, patient is clean and well groomed.  SKIN: The skin is warm and dry, color consistent with ethnicity, patient has normal skin turgor and moist mucus membranes, skin intact, no breakdown or bruising noted.   MUSCULOSKELETAL: Patient moving all extremities spontaneously, no swelling noted. Swelling to bilateral lower extemities  RESPIRATORY: Airway is open and patent, respirations are spontaneous, patient has a normal effort and rate, no accessory muscle use noted, pt placed on continuous pulse ox with O2 sats noted at 97% on room air.  CARDIAC: Denies chest pain HR 57.   GASTRO: Soft and non tender to palpation, no distention noted, normoactive bowel sounds present in all four quadrants. Pt states bowel movements have been regular.  : Pt denies any pain or frequency with urination.  NEURO: Pt opens eyes spontaneously, behavior appropriate to situation, follows commands, facial expression symmetrical, bilateral hand grasp equal and even, purposeful motor response noted, normal sensation in all extremities when touched with a finger.

## 2022-07-25 NOTE — FIRST PROVIDER EVALUATION
Medical screening exam completed.  I have conducted a focused provider triage encounter, findings are as follows:    Brief history of present illness:  Bilateral leg swelling x 2 weeks.  Denies Past Med Hx.  No chest pain or dyspnea    There were no vitals filed for this visit.    Pertinent physical exam:  Significant lower extremity edema    Brief workup plan:  Labs ordered    Preliminary workup initiated; this workup will be continued and followed by the physician or advanced practice provider that is assigned to the patient when roomed.

## 2025-05-08 ENCOUNTER — HOSPITAL ENCOUNTER (EMERGENCY)
Facility: HOSPITAL | Age: 50
Discharge: HOME | End: 2025-05-08
Attending: INTERNAL MEDICINE
Payer: MEDICAID

## 2025-05-08 VITALS
WEIGHT: 192 LBS | DIASTOLIC BLOOD PRESSURE: 101 MMHG | BODY MASS INDEX: 30.13 KG/M2 | SYSTOLIC BLOOD PRESSURE: 153 MMHG | RESPIRATION RATE: 20 BRPM | HEIGHT: 67 IN | TEMPERATURE: 98 F | OXYGEN SATURATION: 98 % | HEART RATE: 70 BPM

## 2025-05-08 DIAGNOSIS — S61.209A AVULSION OF SKIN OF FINGER, INITIAL ENCOUNTER: Primary | ICD-10-CM

## 2025-05-08 PROCEDURE — 63600175 PHARM REV CODE 636 W HCPCS: Mod: ER

## 2025-05-08 PROCEDURE — 25000003 PHARM REV CODE 250: Mod: ER

## 2025-05-08 PROCEDURE — 99283 EMERGENCY DEPT VISIT LOW MDM: CPT | Mod: 25,ER

## 2025-05-08 PROCEDURE — 90471 IMMUNIZATION ADMIN: CPT | Mod: ER

## 2025-05-08 PROCEDURE — 90715 TDAP VACCINE 7 YRS/> IM: CPT | Mod: ER

## 2025-05-08 RX ORDER — ACETAMINOPHEN 500 MG
1000 TABLET ORAL
Status: COMPLETED | OUTPATIENT
Start: 2025-05-08 | End: 2025-05-08

## 2025-05-08 RX ADMIN — CLOSTRIDIUM TETANI TOXOID ANTIGEN (FORMALDEHYDE INACTIVATED), CORYNEBACTERIUM DIPHTHERIAE TOXOID ANTIGEN (FORMALDEHYDE INACTIVATED), BORDETELLA PERTUSSIS TOXOID ANTIGEN (GLUTARALDEHYDE INACTIVATED), BORDETELLA PERTUSSIS FILAMENTOUS HEMAGGLUTININ ANTIGEN (FORMALDEHYDE INACTIVATED), BORDETELLA PERTUSSIS PERTACTIN ANTIGEN, AND BORDETELLA PERTUSSIS FIMBRIAE 2/3 ANTIGEN 0.5 ML: 5; 2; 2.5; 5; 3; 5 INJECTION, SUSPENSION INTRAMUSCULAR at 09:05

## 2025-05-08 RX ADMIN — ACETAMINOPHEN 1000 MG: 500 TABLET ORAL at 09:05

## 2025-05-08 NOTE — Clinical Note
"Dedrick"Ayleen Daily was seen and treated in our emergency department on 5/8/2025.  He may return to work on 05/09/2025.       If you have any questions or concerns, please don't hesitate to call.      Yandy Green PA-C"

## 2025-05-09 NOTE — ED PROVIDER NOTES
Encounter Date: 5/8/2025       History     Chief Complaint   Patient presents with    Laceration     Pt cut L thumb with piece of metal while working on a dryer at 2pm today     50-year-old male with PMHx of heart murmurs and seizures as a child presents to emergency department for a laceration to left thumb that occurred around 2:00 p.m. today.  Patient reports he was at work working on a drier when he cut his thumb.  He reports excessive amount of bleeding and he applied a pressure dressing that has been on since time of incident.  He reports pain and numbness to that left thumb secondary to pressure dressing.  Patient reports last tetanus shot was in 2019.  He denies any decreased range motion or other associated symptoms.        Review of patient's allergies indicates:  No Known Allergies  Past Medical History:   Diagnosis Date    Heart murmur     Seizures     as a child till 14 yrs old.     Past Surgical History:   Procedure Laterality Date    circumcised       Family History   Problem Relation Name Age of Onset    Cancer Mother          Lung CA    Cancer Father          Prostate CA     Social History[1]  Review of Systems   Constitutional:  Negative for fever.   HENT:  Negative for sore throat.    Respiratory:  Negative for shortness of breath.    Cardiovascular:  Negative for chest pain.   Gastrointestinal:  Negative for nausea.   Genitourinary:  Negative for dysuria.   Musculoskeletal:  Negative for back pain.   Skin:  Positive for wound (Left thumb). Negative for rash.   Neurological:  Negative for weakness.   Hematological:  Does not bruise/bleed easily.       Physical Exam     Initial Vitals [05/08/25 2050]   BP Pulse Resp Temp SpO2   (!) 153/101 70 20 98.1 °F (36.7 °C) 98 %      MAP       --         Physical Exam    Nursing note and vitals reviewed.  Constitutional: He appears well-developed and well-nourished. No distress.   HENT:   Head: Normocephalic.   Eyes: Conjunctivae are normal.    Pulmonary/Chest: No respiratory distress.   Musculoskeletal:         General: Normal range of motion.      Comments: Skin avulsion noted to the left thumb.  Bleeding controlled.  Patient has full range motion of thumb without pain.  Sensation intact. < 2 seconds cap refill.  Finger opposition intact.  2+ radial pulse.     Neurological: He is alert.   Skin: Skin is warm and dry. No rash noted.   Psychiatric: He has a normal mood and affect. His behavior is normal. Judgment and thought content normal.         ED Course   Procedures  Labs Reviewed - No data to display       Imaging Results    None          Medications   acetaminophen tablet 1,000 mg (has no administration in time range)   Tdap vaccine injection 0.5 mL (has no administration in time range)     Medical Decision Making  Pt presenting 2/2 laceration. No signs of NV compromise or arterial bleeding.  Skin avulsion present, no skin available to suture or glue back together.  Bleeding is controlled at this time.  When initially reporting to emergency department patient had a pressure dressing with palmar tape wrapped around his thumb.  He was complaining of pain and numbness to that finger.  Remove pressure dressing.  Patient's symptoms were related to the tight pressure dressing that was left on for too long.  On assessment patient has full range motion of thumb finger opposition intact.  Sensation intact distally to avulsion.  < 2 seconds cap refill.  Pain controlled, tolerating po, and able to ambulate.  Updated the patient's tetanus here in ED. Cautious return precautions discussed with patient and/or family with understanding. Prompt f/u with primary care physician discussed. All questions answered. Patient comfortable with plan.       Risk  OTC drugs.  Prescription drug management.                                      Clinical Impression:  Final diagnoses:  [G19.657H] Avulsion of skin of finger, initial encounter (left thumb) (Primary)          ED  Disposition Condition    Discharge Stable          ED Prescriptions    None       Follow-up Information       Follow up With Specialties Details Why Contact Info    Allyson Davison NP Family Medicine Schedule an appointment as soon as possible for a visit in 3 days for follow up [address]  [facility] [address]  [phone]      [facility] ED Emergency Medicine Go to  If symptoms worsen, As needed, shortness of breath, chest pain, fever, worsening cough, nausea, vomiting, abdominal pain [address]  [facility] [address] [phone]               [1]   Social History  Tobacco Use    Smoking status: Every Day     Current packs/day: 0.50     Types: Cigarettes    Smokeless tobacco: Never   Substance Use Topics    Alcohol use: Yes     Comment: occasional    Drug use: Yes     Types: Marijuana     Comment: heroine 2 years ago        Yandy Green PAFreidaC  05/08/25 [id number]